# Patient Record
Sex: MALE | Race: WHITE | NOT HISPANIC OR LATINO | Employment: FULL TIME | URBAN - NONMETROPOLITAN AREA
[De-identification: names, ages, dates, MRNs, and addresses within clinical notes are randomized per-mention and may not be internally consistent; named-entity substitution may affect disease eponyms.]

---

## 2018-10-04 PROCEDURE — 99283 EMERGENCY DEPT VISIT LOW MDM: CPT

## 2018-10-05 ENCOUNTER — HOSPITAL ENCOUNTER (EMERGENCY)
Facility: HOSPITAL | Age: 18
Discharge: HOME | End: 2018-10-05
Attending: EMERGENCY MEDICINE | Admitting: EMERGENCY MEDICINE

## 2018-10-05 VITALS
HEART RATE: 91 BPM | SYSTOLIC BLOOD PRESSURE: 141 MMHG | TEMPERATURE: 97.9 F | HEIGHT: 71 IN | BODY MASS INDEX: 42.08 KG/M2 | RESPIRATION RATE: 18 BRPM | OXYGEN SATURATION: 98 % | WEIGHT: 300.6 LBS | DIASTOLIC BLOOD PRESSURE: 83 MMHG

## 2018-10-05 DIAGNOSIS — S05.01XA ABRASION OF RIGHT CORNEA, INITIAL ENCOUNTER: Primary | ICD-10-CM

## 2018-10-05 RX ORDER — CIPROFLOXACIN HYDROCHLORIDE 3.5 MG/ML
1 SOLUTION/ DROPS TOPICAL
Status: DISCONTINUED | OUTPATIENT
Start: 2018-10-05 | End: 2018-10-05

## 2018-10-05 RX ORDER — SULFACETAMIDE SODIUM 100 MG/ML
1 SOLUTION/ DROPS OPHTHALMIC
Status: DISCONTINUED | OUTPATIENT
Start: 2018-10-05 | End: 2018-10-05 | Stop reason: HOSPADM

## 2018-10-05 RX ADMIN — SULFACETAMIDE SODIUM 1 DROP: 100 SOLUTION/ DROPS OPHTHALMIC at 03:54

## 2018-10-05 NOTE — DISCHARGE INSTRUCTIONS
Juan,     I feel you have an abrasion (a cut) on your eye. Please use the drops I am giving you every 4 hours for the next 5 days. Please return for worsening symptoms, pain, blurry vision or any other issues.     A corneal abrasion is a scratch or injury to the clear covering over the front of your eye (cornea). This can be painful. It is important to get treatment for a corneal abrasion. If this problem is not treated, it can affect your eyesight (vision).  Follow these instructions at home:  Medicines  · Use eye drops or ointments as told by your doctor.  · If you were prescribed antibiotic drops or ointment, use them as told by your doctor. Do not stop using the antibiotic even if you start to feel better.  · Take over-the-counter and prescription medicines only as told by your doctor.  · Do not drive or use heavy machinery while taking prescription pain medicine.  General instructions  · If you have an eye patch, wear it as told by your doctor.  ? Do not drive or use machinery while wearing an eye patch.  ? Follow instructions from your doctor about when to take off the patch.  · Ask your doctor if you can use a cold, wet cloth (compress) on your eye to help with pain.  · Do not rub or touch your eye. Do not wash out your eye.  · Do not wear contact lenses until your doctor says that this is okay.  · Avoid bright light.  · Avoid straining your eyes.  · Keep all follow-up visits as told by your doctor. Doing this can help to prevent infection and loss of eyesight.  Contact a doctor if:  · You continue to have eye pain and other symptoms for more than 2 days.  · You get new symptoms, such as:  ? Redness.  ? Watery eyes (tearing).  ? Discharge.  · You have discharge that makes your eyelids stick together in the morning.  · Symptoms come back after your eye heals.  Get help right away if:  · You have very bad eye pain that does not get better with medicine.  · You lose eyesight.  Summary  · A corneal abrasion  is a scratch or injury to the clear covering over the front of your eye (cornea).  · It is important to get treatment for a corneal abrasion. If this problem is not treated, it can affect your eyesight (vision).  · Use eye drops or ointments as told by your doctor.  · If you have an eye patch, do not drive or use machinery while wearing it.  This information is not intended to replace advice given to you by your health care provider. Make sure you discuss any questions you have with your health care provider.  Document Released: 06/05/2009 Document Revised: 12/02/2017 Document Reviewed: 12/02/2017  [employer] Patient Education © 2017 [employer]

## 2018-10-05 NOTE — ED PROVIDER NOTES
"Subjective   17 y/o male arrives for evaluation of right eye pain for several days after he states he was \"riding a razor\" near a [place]. States he feels like \"there is something in my eye, like a piece of grit.\" States he went to a pharmacist and was given a drop that he cannot remember the name of. States he was told to come to the hospital should his pain not improve or worsen. He denies falls, trauma, contact lens usage, fevers, chills, vision changes or other issues. He arrives in [facility]        Family, social and past history reviewed as below, prior documentation of H and Ps and other documentation are reviewed:    History reviewed. No pertinent past medical history.    Past Surgical History:  No date: EYE SURGERY    Social History    Marital status: Single              Spouse name:                       Years of education:                 Number of children:               Occupational History    None on file    Social History Main Topics    Smoking status: Never Smoker                                                                   Smokeless tobacco: Not on file                       Alcohol use: Yes                Comment: OCCASIONAL    Drug use: Not on file     Sexual activity: Not on file          Other Topics            Concern    None on file    Social History Narrative    None on file        Family history: reviewed and noncontributory             Review of Systems   All other systems reviewed and are negative.      History reviewed. No pertinent past medical history.    Allergies   Allergen Reactions   • Morphine Rash       Past Surgical History:   Procedure Laterality Date   • EYE SURGERY         History reviewed. No pertinent family history.    Social History     Social History   • Marital status: Single     Social History Main Topics   • Smoking status: Never Smoker   • Alcohol use Yes      Comment: OCCASIONAL   • Drug use: Unknown     Other Topics Concern   • Not on file           Objective "   Physical Exam   Constitutional: He is oriented to person, place, and time. He appears well-developed and well-nourished.   HENT:   Head: Normocephalic.   Nose: Nose normal.   Eyes: Pupils are equal, round, and reactive to light. Conjunctivae, EOM and lids are normal. Lids are everted and swept, no foreign bodies found. Right eye exhibits no discharge. Left eye exhibits no discharge. No scleral icterus.   No uptake on staining, I do no see any fb. I did sweep both eyes without findings of FB   Neck: Normal range of motion. Neck supple.   Cardiovascular: Normal rate, regular rhythm, normal heart sounds and intact distal pulses.    Pulmonary/Chest: Effort normal and breath sounds normal.   Abdominal: Soft. Bowel sounds are normal.   Musculoskeletal: Normal range of motion.   Neurological: He is alert and oriented to person, place, and time.   Skin: Skin is warm. Capillary refill takes less than 2 seconds.   Psychiatric: He has a normal mood and affect. His behavior is normal.   Vitals reviewed.      Procedures           ED Course      I do not see an active FB or abrasion. However, he is describing symptoms of an abrasion. Given this will treat like he has one and encourage follow up with opthalmology.             MDM      Final diagnoses:   Abrasion of right cornea, initial encounter            Diogenes Noel MD  10/05/18 [id number]